# Patient Record
(demographics unavailable — no encounter records)

---

## 2025-06-04 NOTE — CONSULT LETTER
[Dear  ___] : Dear  [unfilled], [Consult Letter:] : I had the pleasure of evaluating your patient, [unfilled]. [( Thank you for referring [unfilled] for consultation for _____ )] : Thank you for referring [unfilled] for consultation for [unfilled] [Please see my note below.] : Please see my note below. [Consult Closing:] : Thank you very much for allowing me to participate in the care of this patient.  If you have any questions, please do not hesitate to contact me. [Sincerely,] : Sincerely, [FreeTextEntry3] : Dr. Pranay Brice MD Attending Physician Pediatric Neurology and Epilepsy

## 2025-06-04 NOTE — HISTORY OF PRESENT ILLNESS
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   Interval History: Since hospital discharge, no further seizures noted. Summer has been tolerating medications well, no adverse side effects at this time.   Medications:  LCM 60mg BID (5mg/kg/day)  History Reviewed:  Seizure characterized by staring with unresponsiveness and drooling, with right hand jerks, which then progressed to brief episode of full body shaking. Entire event lasted 4 min. EMS called, and pt then had another seizure-like event in ambulance, this one characterized by b/l eye fluttering with unresponsiveness x 2 min.  Following each event, pt confused, crying and inconsolable, and then falls asleep. Mom reports pt remained "groggy" for hours after these events, before slowly returning to baseline. No recent fevers, illness,  trauma or travel. Mom does report some loose stools since last night, but no associated n/v, and pt eating and drinking as normal.  ED workup at OSH with normal CBC, (CMP hemolyzed), and neg RVP. No imaging performed, and no medications given. [place] [facility] [place].  In terms of development, pt has been meeting all milestones with no concerns. Mother with history of seizures as adult triggered by severe preeclampsia (started in 1st trimester) when pregnant with younger sibling. Mother's seizures then continued after the pregnancy, characterized by inability to talk and right hand movements with retained awareness. She is maintained on Keppra. Mom's recent MRI brain with concern for MS.  Direct transfer to floor from [place] ED. Arrived to floor in stable condition. vEEG (5/19 - 5/19). Preliminary vEEG Impression:  This is an abnormal EEG due to the presence of: -Frequent, bisynchronous and, at times, independent O1/O2 spike/spike-wave sleep-potentiated discharges that occur both in wakefulness and sleep.  Summer was loaded with Vimpat 110mg(5mg/kg) and discharged home on Vimpat 60mg BID. Parents were educated about SeLEAS in detail. They understand that it is a self limiting epilepsy and opted to start medications. Valtoco for emergency medication use.

## 2025-06-04 NOTE — PHYSICAL EXAM
[Well-appearing] : well-appearing [Alert] : alert [Well related, good eye contact] : well related, good eye contact [Conversant] : conversant [Normal speech and language] : normal speech and language [Follows instructions well] : follows instructions well [VFF] : VFF [Pupils reactive to light and accommodation] : pupils reactive to light and accommodation [Full extraocular movements] : full extraocular movements [Saccadic and smooth pursuits intact] : saccadic and smooth pursuits intact [No nystagmus] : no nystagmus [No facial asymmetry or weakness] : no facial asymmetry or weakness [Gross hearing intact] : gross hearing intact [Equal palate elevation] : equal palate elevation [Good shoulder shrug] : good shoulder shrug [Normal tongue movement] : normal tongue movement [Midline tongue, no fasciculations] : midline tongue, no fasciculations [Normal axial and appendicular muscle tone] : normal axial and appendicular muscle tone [Gets up on table without difficulty] : gets up on table without difficulty [No abnormal involuntary movements] : no abnormal involuntary movements [5/5 strength in proximal and distal muscles of arms and legs] : 5/5 strength in proximal and distal muscles of arms and legs [Walks and runs well] : walks and runs well [2+ biceps] : 2+ biceps [Triceps] : triceps [Knee jerks] : knee jerks [Ankle jerks] : ankle jerks [No ankle clonus] : no ankle clonus [Localizes LT and temperature] : localizes LT and temperature [Good walking balance] : good walking balance [Normal gait] : normal gait

## 2025-06-04 NOTE — ASSESSMENT
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 Neurologic examination non focal. Tolerating medication well with no side effects. Will continue current regimen at this time. RTC in 3 months with lab draw at the next visit.

## 2025-06-04 NOTE — PLAN
[FreeTextEntry1] : [ ] Refills sent, continue LCM 60mg BID [ ] Valtoco already with patient, reviewed when to administer in setting of seizure [ ] RTC 3 months, labwork will be performed at that time

## 2025-06-04 NOTE — REASON FOR VISIT
[Hospital Follow-Up] : a hospital follow-up for [Seizure Disorder] : seizure disorder [Mother] : mother